# Patient Record
Sex: FEMALE | Race: WHITE | NOT HISPANIC OR LATINO | URBAN - METROPOLITAN AREA
[De-identification: names, ages, dates, MRNs, and addresses within clinical notes are randomized per-mention and may not be internally consistent; named-entity substitution may affect disease eponyms.]

---

## 2023-11-15 ENCOUNTER — LAB OUTSIDE AN ENCOUNTER (OUTPATIENT)
Dept: URBAN - METROPOLITAN AREA CLINIC 40 | Facility: CLINIC | Age: 38
End: 2023-11-15

## 2023-11-15 ENCOUNTER — OFFICE VISIT (OUTPATIENT)
Dept: URBAN - METROPOLITAN AREA CLINIC 40 | Facility: CLINIC | Age: 38
End: 2023-11-15
Payer: COMMERCIAL

## 2023-11-15 VITALS
WEIGHT: 246.6 LBS | HEART RATE: 80 BPM | HEIGHT: 66 IN | TEMPERATURE: 98.2 F | DIASTOLIC BLOOD PRESSURE: 84 MMHG | BODY MASS INDEX: 39.63 KG/M2 | SYSTOLIC BLOOD PRESSURE: 122 MMHG

## 2023-11-15 DIAGNOSIS — K59.01 SLOW TRANSIT CONSTIPATION: ICD-10-CM

## 2023-11-15 DIAGNOSIS — R10.84 GENERALIZED ABDOMINAL PAIN: ICD-10-CM

## 2023-11-15 DIAGNOSIS — R19.4 CHANGE IN STOOL HABITS: ICD-10-CM

## 2023-11-15 PROBLEM — 35298007: Status: ACTIVE | Noted: 2023-11-15

## 2023-11-15 PROCEDURE — 99203 OFFICE O/P NEW LOW 30 MIN: CPT | Performed by: NURSE PRACTITIONER

## 2023-11-15 RX ORDER — POLYETHYLENE GLYCOL 3350, SODIUM SULFATE, SODIUM CHLORIDE, POTASSIUM CHLORIDE, ASCORBIC ACID, SODIUM ASCORBATE 140-9-5.2G
AS DIRECTED KIT ORAL
Qty: 1 KIT | Refills: 0 | OUTPATIENT
Start: 2023-11-15 | End: 2023-11-16

## 2023-11-15 NOTE — HPI-TODAY'S VISIT:
37-year-old female patient with past medical history as listed below, new patient. Referral dated November 14 2023  From [place] [facility]. Labs from November 2023 normal thyroid,normal urine, normal CBC, normal CMP, hepatitis C negative,hepatitis B negative, pregnancy test negative, HIV negative, A1c 5.3,syphilis test negative.  Referred for left upper quadrant abdominal pain. PCP ordering CT scan.   -- The patient presents today stating she has had GI issues since her teens. She has a new PCP, and he referred her because he was surprised she has never had colonoscopy with all her years of GI complaints. She states she  has been dealing with this since a teenager, Has chronic  constipation, had a week of no BMs took MOM with only little selena as result.  Her abdominal pain  goes from different sides, left to right. She has had multiple scans, US to look at liver and gallbladder, told is normal. Grandmother with celiac, she tested neg for celiac. She has had allergy testing. No family hx CRC, stools normally  every other day,  small amount, selena, incomplete evacuation. Her last ultrasound was 1 year ago, normal. She takes stool softener, ex lax. Tool MiraLAX daily for 6 months. Last BM  yesterday afternoon little selena size of pinky. EGD at 19 normal, EGD close to [facility]  within last 2 years, normal aside from acid reflux, small hiatal hernia.

## 2023-11-27 ENCOUNTER — CLAIMS CREATED FROM THE CLAIM WINDOW (OUTPATIENT)
Dept: URBAN - METROPOLITAN AREA SURGERY CENTER 30 | Facility: SURGERY CENTER | Age: 38
End: 2023-11-27
Payer: COMMERCIAL

## 2023-11-27 ENCOUNTER — CLAIMS CREATED FROM THE CLAIM WINDOW (OUTPATIENT)
Dept: URBAN - METROPOLITAN AREA CLINIC 4 | Facility: CLINIC | Age: 38
End: 2023-11-27
Payer: COMMERCIAL

## 2023-11-27 DIAGNOSIS — K59.09 CHANGE IN BOWEL MOVEMENTS INTERMITTENT CONSTIPATION. URGENCY IN THE MORNING.: ICD-10-CM

## 2023-11-27 DIAGNOSIS — D12.5 ADENOMA OF SIGMOID COLON: ICD-10-CM

## 2023-11-27 DIAGNOSIS — D12.2 ADENOMA OF ASCENDING COLON: ICD-10-CM

## 2023-11-27 DIAGNOSIS — D12.2 BENIGN NEOPLASM OF ASCENDING COLON: ICD-10-CM

## 2023-11-27 DIAGNOSIS — Z12.11 COLON CANCER SCREENING: ICD-10-CM

## 2023-11-27 DIAGNOSIS — R10.84 ABDOMINAL CRAMPING, GENERALIZED: ICD-10-CM

## 2023-11-27 DIAGNOSIS — K63.5 COLONIC POLYPS: ICD-10-CM

## 2023-11-27 DIAGNOSIS — K64.8 OTHER HEMORRHOIDS: ICD-10-CM

## 2023-11-27 PROCEDURE — 45385 COLONOSCOPY W/LESION REMOVAL: CPT | Performed by: INTERNAL MEDICINE

## 2023-11-27 PROCEDURE — 88305 TISSUE EXAM BY PATHOLOGIST: CPT | Performed by: PATHOLOGY

## 2023-11-27 PROCEDURE — G8907 PT DOC NO EVENTS ON DISCHARG: HCPCS | Performed by: INTERNAL MEDICINE

## 2023-11-27 PROCEDURE — 00811 ANES LWR INTST NDSC NOS: CPT | Performed by: NURSE ANESTHETIST, CERTIFIED REGISTERED

## 2023-12-27 ENCOUNTER — OFFICE VISIT (OUTPATIENT)
Dept: URBAN - METROPOLITAN AREA CLINIC 40 | Facility: CLINIC | Age: 38
End: 2023-12-27
Payer: COMMERCIAL

## 2023-12-27 ENCOUNTER — DASHBOARD ENCOUNTERS (OUTPATIENT)
Age: 38
End: 2023-12-27

## 2023-12-27 VITALS
HEIGHT: 66 IN | DIASTOLIC BLOOD PRESSURE: 78 MMHG | WEIGHT: 241.8 LBS | TEMPERATURE: 97.7 F | HEART RATE: 80 BPM | SYSTOLIC BLOOD PRESSURE: 120 MMHG | BODY MASS INDEX: 38.86 KG/M2

## 2023-12-27 DIAGNOSIS — K64.8 INTERNAL HEMORRHOIDS: ICD-10-CM

## 2023-12-27 DIAGNOSIS — K58.1 IRRITABLE BOWEL SYNDROME WITH CONSTIPATION: ICD-10-CM

## 2023-12-27 DIAGNOSIS — K64.4 HEMORRHOIDS, EXTERNAL: ICD-10-CM

## 2023-12-27 DIAGNOSIS — D12.6 ADENOMATOUS POLYP OF COLON, UNSPECIFIED PART OF COLON: ICD-10-CM

## 2023-12-27 DIAGNOSIS — D12.5 ADENOMA OF SIGMOID COLON: ICD-10-CM

## 2023-12-27 DIAGNOSIS — D12.2 ADENOMA OF ASCENDING COLON: ICD-10-CM

## 2023-12-27 PROBLEM — 440630006: Status: ACTIVE | Noted: 2023-12-27

## 2023-12-27 PROCEDURE — 99213 OFFICE O/P EST LOW 20 MIN: CPT | Performed by: NURSE PRACTITIONER

## 2023-12-27 NOTE — HPI-TODAY'S VISIT:
37-year-old female patient with past medical history as listed below.  Last seen by me November 15, 2023, referral from [facility].  Labs from November 2023 normal thyroid,normal urine, normal CBC, normal CMP, hepatitis C negative,hepatitis B negative, pregnancy test negative, HIV negative, A1c 5.3,syphilis test negative. Referred for left upper quadrant abdominal pain. PCP ordering CT scan.  Recall c/o GI issues since her teens. She has a new PCP, and he referred her because he was surprised she has never had colonoscopy with all her years of GI complaints. She states she has been dealing with this since a teenager, Has chronic constipation, had a week of no BMs, took MOM with only little selena as result. Her abdominal pain goes from different sides, left to right. She has had multiple scans, US to look at liver and gallbladder, told is normal. Grandmother with celiac, she tested neg for celiac. She has had allergy testing. No family hx CRC, stools normally every other day, small amount, selena, incomplete evacuation. Her last ultrasound was 1 year ago, normal. She takes stool softener, ex lax. Took MiraLAX daily for 6 months. EGD at 19 normal, EGD close to [facility] within last 2 years, normal aside from acid reflux, small hiatal hernia.   --Colonoscopy November 27, 2023 : Dr. Hardy.  Normal digital rectal exam, normal terminal ileum, 5 polyps in the sigmoid and ascending colon removed.  External and internal hemorrhoids. Repeat colonoscopy in 3 to 5 years. Pathology showed sessile serrated adenoma on a sending and mix of tubular adenoma and hyperplastic on sigmoid polyps.  Repeat colonoscopy 5 years per recall letter.   -- The patient presents to follow-up after colonoscopy, she is feeling well.  She states that the Linzess samples helped her, she did not feel the 290 mcg was too much, they really made a difference in her bowel movements.She is keeping a food diary now to see what aggravates her stomach, she noticed that a salad she ate caused her to have some cramping. Otherwise she has no concerns or complaints and is feeling good.  Will prescribe Linzess and attempt to get coverage for her as it seems to be working well for her irritable bowel syndrome constipation.